# Patient Record
Sex: MALE | Race: WHITE | Employment: FULL TIME | URBAN - METROPOLITAN AREA
[De-identification: names, ages, dates, MRNs, and addresses within clinical notes are randomized per-mention and may not be internally consistent; named-entity substitution may affect disease eponyms.]

---

## 2019-09-26 ENCOUNTER — APPOINTMENT (OUTPATIENT)
Dept: CT IMAGING | Age: 59
DRG: 176 | End: 2019-09-26
Payer: COMMERCIAL

## 2019-09-26 ENCOUNTER — APPOINTMENT (OUTPATIENT)
Dept: GENERAL RADIOLOGY | Age: 59
DRG: 176 | End: 2019-09-26
Payer: COMMERCIAL

## 2019-09-26 ENCOUNTER — HOSPITAL ENCOUNTER (INPATIENT)
Age: 59
LOS: 2 days | Discharge: HOME | DRG: 176 | End: 2019-09-28
Attending: EMERGENCY MEDICINE | Admitting: INTERNAL MEDICINE
Payer: COMMERCIAL

## 2019-09-26 DIAGNOSIS — I26.09 ACUTE PULMONARY EMBOLISM WITH ACUTE COR PULMONALE, UNSPECIFIED PULMONARY EMBOLISM TYPE (HCC): Primary | ICD-10-CM

## 2019-09-26 PROBLEM — I26.99 MULTIPLE PULMONARY EMBOLI (HCC): Status: ACTIVE | Noted: 2019-09-26

## 2019-09-26 LAB
ABSOLUTE EOS #: 0.08 K/UL (ref 0–0.44)
ABSOLUTE IMMATURE GRANULOCYTE: 0.03 K/UL (ref 0–0.3)
ABSOLUTE LYMPH #: 1.15 K/UL (ref 1.1–3.7)
ABSOLUTE MONO #: 0.74 K/UL (ref 0.1–1.2)
ANION GAP SERPL CALCULATED.3IONS-SCNC: 11 MMOL/L (ref 9–17)
BASOPHILS # BLD: 1 % (ref 0–2)
BASOPHILS ABSOLUTE: 0.03 K/UL (ref 0–0.2)
BUN BLDV-MCNC: 15 MG/DL (ref 8–23)
BUN/CREAT BLD: 21 (ref 9–20)
CALCIUM SERPL-MCNC: 8.5 MG/DL (ref 8.6–10.4)
CHLORIDE BLD-SCNC: 103 MMOL/L (ref 98–107)
CO2: 27 MMOL/L (ref 20–31)
CREAT SERPL-MCNC: 0.73 MG/DL (ref 0.7–1.2)
DIFFERENTIAL TYPE: ABNORMAL
EOSINOPHILS RELATIVE PERCENT: 1 % (ref 1–4)
GFR AFRICAN AMERICAN: >60 ML/MIN
GFR NON-AFRICAN AMERICAN: >60 ML/MIN
GFR SERPL CREATININE-BSD FRML MDRD: ABNORMAL ML/MIN/{1.73_M2}
GFR SERPL CREATININE-BSD FRML MDRD: ABNORMAL ML/MIN/{1.73_M2}
GLUCOSE BLD-MCNC: 97 MG/DL (ref 70–99)
HCT VFR BLD CALC: 36.9 % (ref 40.7–50.3)
HCT VFR BLD CALC: 40 % (ref 40.7–50.3)
HEMOGLOBIN: 12.4 G/DL (ref 13–17)
HEMOGLOBIN: 13.5 G/DL (ref 13–17)
IMMATURE GRANULOCYTES: 1 %
LYMPHOCYTES # BLD: 17 % (ref 24–43)
MCH RBC QN AUTO: 30.5 PG (ref 25.2–33.5)
MCH RBC QN AUTO: 31 PG (ref 25.2–33.5)
MCHC RBC AUTO-ENTMCNC: 33.6 G/DL (ref 28.4–34.8)
MCHC RBC AUTO-ENTMCNC: 33.8 G/DL (ref 28.4–34.8)
MCV RBC AUTO: 90.9 FL (ref 82.6–102.9)
MCV RBC AUTO: 91.7 FL (ref 82.6–102.9)
MONOCYTES # BLD: 11 % (ref 3–12)
MYOGLOBIN: <21 NG/ML (ref 28–72)
NRBC AUTOMATED: 0 PER 100 WBC
NRBC AUTOMATED: 0 PER 100 WBC
PARTIAL THROMBOPLASTIN TIME: 110.6 SEC (ref 23–31)
PARTIAL THROMBOPLASTIN TIME: 27.5 SEC (ref 23–31)
PDW BLD-RTO: 12.4 % (ref 11.8–14.4)
PDW BLD-RTO: 12.5 % (ref 11.8–14.4)
PLATELET # BLD: 234 K/UL (ref 138–453)
PLATELET # BLD: 260 K/UL (ref 138–453)
PLATELET ESTIMATE: ABNORMAL
PMV BLD AUTO: 9.3 FL (ref 8.1–13.5)
PMV BLD AUTO: 9.9 FL (ref 8.1–13.5)
POTASSIUM SERPL-SCNC: 3.7 MMOL/L (ref 3.7–5.3)
RBC # BLD: 4.06 M/UL (ref 4.21–5.77)
RBC # BLD: 4.36 M/UL (ref 4.21–5.77)
RBC # BLD: ABNORMAL 10*6/UL
SEG NEUTROPHILS: 69 % (ref 36–65)
SEGMENTED NEUTROPHILS ABSOLUTE COUNT: 4.59 K/UL (ref 1.5–8.1)
SODIUM BLD-SCNC: 141 MMOL/L (ref 135–144)
TROPONIN INTERP: ABNORMAL
TROPONIN T: ABNORMAL NG/ML
TROPONIN, HIGH SENSITIVITY: 11 NG/L (ref 0–22)
WBC # BLD: 6.3 K/UL (ref 3.5–11.3)
WBC # BLD: 6.6 K/UL (ref 3.5–11.3)
WBC # BLD: ABNORMAL 10*3/UL

## 2019-09-26 PROCEDURE — 2580000003 HC RX 258: Performed by: EMERGENCY MEDICINE

## 2019-09-26 PROCEDURE — 93005 ELECTROCARDIOGRAM TRACING: CPT | Performed by: NURSE PRACTITIONER

## 2019-09-26 PROCEDURE — 84484 ASSAY OF TROPONIN QUANT: CPT

## 2019-09-26 PROCEDURE — 85730 THROMBOPLASTIN TIME PARTIAL: CPT

## 2019-09-26 PROCEDURE — 71260 CT THORAX DX C+: CPT

## 2019-09-26 PROCEDURE — 85025 COMPLETE CBC W/AUTO DIFF WBC: CPT

## 2019-09-26 PROCEDURE — 6360000004 HC RX CONTRAST MEDICATION: Performed by: EMERGENCY MEDICINE

## 2019-09-26 PROCEDURE — 2060000000 HC ICU INTERMEDIATE R&B

## 2019-09-26 PROCEDURE — 99223 1ST HOSP IP/OBS HIGH 75: CPT | Performed by: NURSE PRACTITIONER

## 2019-09-26 PROCEDURE — 85027 COMPLETE CBC AUTOMATED: CPT

## 2019-09-26 PROCEDURE — 36415 COLL VENOUS BLD VENIPUNCTURE: CPT

## 2019-09-26 PROCEDURE — 6360000002 HC RX W HCPCS: Performed by: EMERGENCY MEDICINE

## 2019-09-26 PROCEDURE — 71045 X-RAY EXAM CHEST 1 VIEW: CPT

## 2019-09-26 PROCEDURE — 80048 BASIC METABOLIC PNL TOTAL CA: CPT

## 2019-09-26 PROCEDURE — 99285 EMERGENCY DEPT VISIT HI MDM: CPT

## 2019-09-26 PROCEDURE — 83874 ASSAY OF MYOGLOBIN: CPT

## 2019-09-26 PROCEDURE — 6360000002 HC RX W HCPCS: Performed by: NURSE PRACTITIONER

## 2019-09-26 RX ORDER — MAGNESIUM SULFATE 1 G/100ML
1 INJECTION INTRAVENOUS PRN
Status: DISCONTINUED | OUTPATIENT
Start: 2019-09-26 | End: 2019-09-28 | Stop reason: HOSPADM

## 2019-09-26 RX ORDER — HEPARIN SODIUM 1000 [USP'U]/ML
80 INJECTION, SOLUTION INTRAVENOUS; SUBCUTANEOUS PRN
Status: DISCONTINUED | OUTPATIENT
Start: 2019-09-26 | End: 2019-09-28

## 2019-09-26 RX ORDER — HEPARIN SODIUM 1000 [USP'U]/ML
80 INJECTION, SOLUTION INTRAVENOUS; SUBCUTANEOUS PRN
Status: DISCONTINUED | OUTPATIENT
Start: 2019-09-26 | End: 2019-09-26 | Stop reason: SDUPTHER

## 2019-09-26 RX ORDER — POTASSIUM CHLORIDE 7.45 MG/ML
10 INJECTION INTRAVENOUS PRN
Status: DISCONTINUED | OUTPATIENT
Start: 2019-09-26 | End: 2019-09-28 | Stop reason: HOSPADM

## 2019-09-26 RX ORDER — SODIUM CHLORIDE 0.9 % (FLUSH) 0.9 %
10 SYRINGE (ML) INJECTION
Status: COMPLETED | OUTPATIENT
Start: 2019-09-26 | End: 2019-09-26

## 2019-09-26 RX ORDER — ACETAMINOPHEN 325 MG/1
650 TABLET ORAL EVERY 4 HOURS PRN
Status: DISCONTINUED | OUTPATIENT
Start: 2019-09-26 | End: 2019-09-28 | Stop reason: HOSPADM

## 2019-09-26 RX ORDER — SODIUM CHLORIDE 0.9 % (FLUSH) 0.9 %
10 SYRINGE (ML) INJECTION PRN
Status: DISCONTINUED | OUTPATIENT
Start: 2019-09-26 | End: 2019-09-28 | Stop reason: HOSPADM

## 2019-09-26 RX ORDER — HEPARIN SODIUM 10000 [USP'U]/100ML
18 INJECTION, SOLUTION INTRAVENOUS CONTINUOUS
Status: DISCONTINUED | OUTPATIENT
Start: 2019-09-26 | End: 2019-09-26 | Stop reason: SDUPTHER

## 2019-09-26 RX ORDER — POTASSIUM CHLORIDE 20 MEQ/1
40 TABLET, EXTENDED RELEASE ORAL PRN
Status: DISCONTINUED | OUTPATIENT
Start: 2019-09-26 | End: 2019-09-28 | Stop reason: HOSPADM

## 2019-09-26 RX ORDER — 0.9 % SODIUM CHLORIDE 0.9 %
80 INTRAVENOUS SOLUTION INTRAVENOUS ONCE
Status: COMPLETED | OUTPATIENT
Start: 2019-09-26 | End: 2019-09-26

## 2019-09-26 RX ORDER — SODIUM CHLORIDE 0.9 % (FLUSH) 0.9 %
10 SYRINGE (ML) INJECTION EVERY 12 HOURS SCHEDULED
Status: DISCONTINUED | OUTPATIENT
Start: 2019-09-26 | End: 2019-09-28 | Stop reason: HOSPADM

## 2019-09-26 RX ORDER — HEPARIN SODIUM 1000 [USP'U]/ML
80 INJECTION, SOLUTION INTRAVENOUS; SUBCUTANEOUS ONCE
Status: COMPLETED | OUTPATIENT
Start: 2019-09-26 | End: 2019-09-26

## 2019-09-26 RX ORDER — HEPARIN SODIUM 10000 [USP'U]/100ML
18 INJECTION, SOLUTION INTRAVENOUS CONTINUOUS
Status: DISCONTINUED | OUTPATIENT
Start: 2019-09-26 | End: 2019-09-28

## 2019-09-26 RX ORDER — HEPARIN SODIUM 1000 [USP'U]/ML
40 INJECTION, SOLUTION INTRAVENOUS; SUBCUTANEOUS PRN
Status: DISCONTINUED | OUTPATIENT
Start: 2019-09-26 | End: 2019-09-26 | Stop reason: SDUPTHER

## 2019-09-26 RX ORDER — NICOTINE 21 MG/24HR
1 PATCH, TRANSDERMAL 24 HOURS TRANSDERMAL DAILY PRN
Status: DISCONTINUED | OUTPATIENT
Start: 2019-09-26 | End: 2019-09-28 | Stop reason: HOSPADM

## 2019-09-26 RX ORDER — ONDANSETRON 2 MG/ML
4 INJECTION INTRAMUSCULAR; INTRAVENOUS EVERY 6 HOURS PRN
Status: DISCONTINUED | OUTPATIENT
Start: 2019-09-26 | End: 2019-09-28 | Stop reason: HOSPADM

## 2019-09-26 RX ORDER — HEPARIN SODIUM 1000 [USP'U]/ML
40 INJECTION, SOLUTION INTRAVENOUS; SUBCUTANEOUS PRN
Status: DISCONTINUED | OUTPATIENT
Start: 2019-09-26 | End: 2019-09-28

## 2019-09-26 RX ORDER — ONDANSETRON 4 MG/1
4 TABLET, ORALLY DISINTEGRATING ORAL EVERY 6 HOURS PRN
Status: DISCONTINUED | OUTPATIENT
Start: 2019-09-26 | End: 2019-09-28 | Stop reason: HOSPADM

## 2019-09-26 RX ORDER — HEPARIN SODIUM 1000 [USP'U]/ML
80 INJECTION, SOLUTION INTRAVENOUS; SUBCUTANEOUS ONCE
Status: DISCONTINUED | OUTPATIENT
Start: 2019-09-26 | End: 2019-09-26 | Stop reason: SDUPTHER

## 2019-09-26 RX ORDER — ONDANSETRON 2 MG/ML
4 INJECTION INTRAMUSCULAR; INTRAVENOUS EVERY 6 HOURS PRN
Status: DISCONTINUED | OUTPATIENT
Start: 2019-09-26 | End: 2019-09-26 | Stop reason: ALTCHOICE

## 2019-09-26 RX ADMIN — HEPARIN SODIUM AND DEXTROSE 15 UNITS/KG/HR: 10000; 5 INJECTION INTRAVENOUS at 22:37

## 2019-09-26 RX ADMIN — HEPARIN SODIUM 6210 UNITS: 1000 INJECTION, SOLUTION INTRAVENOUS; SUBCUTANEOUS at 19:28

## 2019-09-26 RX ADMIN — HEPARIN SODIUM AND DEXTROSE 18 UNITS/KG/HR: 10000; 5 INJECTION INTRAVENOUS at 19:29

## 2019-09-26 RX ADMIN — SODIUM CHLORIDE 80 ML: 0.9 INJECTION, SOLUTION INTRAVENOUS at 18:23

## 2019-09-26 RX ADMIN — HEPARIN SODIUM AND DEXTROSE 18 UNITS/KG/HR: 10000; 5 INJECTION INTRAVENOUS at 20:30

## 2019-09-26 RX ADMIN — IOPAMIDOL 75 ML: 755 INJECTION, SOLUTION INTRAVENOUS at 18:23

## 2019-09-26 RX ADMIN — SODIUM CHLORIDE, PRESERVATIVE FREE 10 ML: 5 INJECTION INTRAVENOUS at 18:24

## 2019-09-26 ASSESSMENT — ENCOUNTER SYMPTOMS
DIARRHEA: 0
BACK PAIN: 0
SHORTNESS OF BREATH: 1
NAUSEA: 0
COUGH: 0
VOMITING: 0
CONSTIPATION: 0
ABDOMINAL PAIN: 0
CHEST TIGHTNESS: 0

## 2019-09-26 NOTE — ED PROVIDER NOTES
drugs.    REVIEW OF SYSTEMS    (2-9 systems for level 4, 10 or more for level 5)     Review of Systems   Constitutional: Negative for activity change, diaphoresis, fatigue and fever. Respiratory: Positive for shortness of breath. Negative for cough and chest tightness. Cardiovascular: Positive for chest pain. Negative for palpitations and leg swelling. Gastrointestinal: Negative for abdominal pain, constipation, diarrhea, nausea and vomiting. Musculoskeletal: Negative for back pain and myalgias. Neurological: Negative for dizziness and headaches. Except as noted above the remainder of the review of systems was reviewed and negative. PHYSICAL EXAM    (up to 7 for level 4, 8 or more for level 5)     Vitals:    09/26/19 1843 09/26/19 1900 09/26/19 2028 09/27/19 0049   BP: (!) 145/78  (!) 143/80 116/71   Pulse: 66 65 62 64   Resp: 20 21 16 16   Temp:   99.1 °F (37.3 °C) 97.9 °F (36.6 °C)   TempSrc:   Oral Oral   SpO2: 97% 97% 99% 96%   Weight:       Height:             Physical Exam   Constitutional: He is oriented to person, place, and time. He appears well-developed and well-nourished. HENT:   Mouth/Throat: Oropharynx is clear and moist. No oropharyngeal exudate. Eyes: Conjunctivae are normal. Right eye exhibits no discharge. Left eye exhibits no discharge. Cardiovascular: Normal rate and regular rhythm. Pulmonary/Chest: Effort normal and breath sounds normal.   Abdominal: Soft. There is no tenderness. Musculoskeletal: He exhibits no edema or tenderness. Neurological: He is alert and oriented to person, place, and time. Skin: Skin is warm and dry. Psychiatric: He has a normal mood and affect.  His behavior is normal.           DIAGNOSTIC RESULTS     EKG: All EKG's are interpreted by the Emergency Department Physician who either signs or Co-signs this chart in the absence of a cardiologist.    EKG was interpreted by Dr. Richard Alcantar:   Non-plain film images such as CT, 0049   BP: (!) 145/78  (!) 143/80 116/71   Pulse: 66 65 62 64   Resp: 20 21 16 16   Temp:   99.1 °F (37.3 °C) 97.9 °F (36.6 °C)   TempSrc:   Oral Oral   SpO2: 97% 97% 99% 96%   Weight:       Height:           Medical Decision Makin-year-old male with chest pain after recent travel. CT scan confirms PE with right heart strain. Additional work-up was unremarkable. Started on heparin and will be admitted for further evaluation and treatment. CONSULTS:  IP CONSULT TO INTERNAL MEDICINE  IP CONSULT TO INTERVENTIONAL RADIOLOGY  IP CONSULT TO PULMONOLOGY    PROCEDURES:  None    FINAL IMPRESSION      1. Acute pulmonary embolism with acute cor pulmonale, unspecified pulmonary embolism type [facility])          DISPOSITION/PLAN   DISPOSITION Admitted 2019 07:22:55 PM      PATIENT REFERRED TO:   Hussain Ramey MD  [address] [facility] [address] [phone]            DISCHARGE MEDICATIONS:     There are no discharge medications for this patient. (Please note that portions of this note were completed with a voice recognition program.  Efforts were made to edit the dictations but occasionally words are mis-transcribed. )    AZEB JIMENEZ, VISH - VISH Snyder - BRENDON  [phone]

## 2019-09-27 PROBLEM — R09.89 PLEURAL RUB: Status: ACTIVE | Noted: 2019-09-27

## 2019-09-27 LAB
ANION GAP SERPL CALCULATED.3IONS-SCNC: 10 MMOL/L (ref 9–17)
BUN BLDV-MCNC: 12 MG/DL (ref 8–23)
BUN/CREAT BLD: 18 (ref 9–20)
CALCIUM SERPL-MCNC: 8.1 MG/DL (ref 8.6–10.4)
CHLORIDE BLD-SCNC: 105 MMOL/L (ref 98–107)
CO2: 26 MMOL/L (ref 20–31)
CREAT SERPL-MCNC: 0.68 MG/DL (ref 0.7–1.2)
EKG ATRIAL RATE: 65 BPM
EKG P AXIS: 51 DEGREES
EKG P-R INTERVAL: 180 MS
EKG Q-T INTERVAL: 406 MS
EKG QRS DURATION: 126 MS
EKG QTC CALCULATION (BAZETT): 422 MS
EKG R AXIS: 61 DEGREES
EKG T AXIS: 51 DEGREES
EKG VENTRICULAR RATE: 65 BPM
GFR AFRICAN AMERICAN: >60 ML/MIN
GFR NON-AFRICAN AMERICAN: >60 ML/MIN
GFR SERPL CREATININE-BSD FRML MDRD: ABNORMAL ML/MIN/{1.73_M2}
GFR SERPL CREATININE-BSD FRML MDRD: ABNORMAL ML/MIN/{1.73_M2}
GLUCOSE BLD-MCNC: 100 MG/DL (ref 70–99)
HCT VFR BLD CALC: 37.2 % (ref 40.7–50.3)
HEMOGLOBIN: 12.4 G/DL (ref 13–17)
INR BLD: 1
LV EF: 60 %
LVEF MODALITY: NORMAL
MCH RBC QN AUTO: 30.8 PG (ref 25.2–33.5)
MCHC RBC AUTO-ENTMCNC: 33.3 G/DL (ref 28.4–34.8)
MCV RBC AUTO: 92.3 FL (ref 82.6–102.9)
NRBC AUTOMATED: 0 PER 100 WBC
PARTIAL THROMBOPLASTIN TIME: 42.8 SEC (ref 23–31)
PARTIAL THROMBOPLASTIN TIME: 49.2 SEC (ref 23–31)
PARTIAL THROMBOPLASTIN TIME: 58.5 SEC (ref 23–31)
PDW BLD-RTO: 12.5 % (ref 11.8–14.4)
PLATELET # BLD: 227 K/UL (ref 138–453)
PMV BLD AUTO: 9.3 FL (ref 8.1–13.5)
POTASSIUM SERPL-SCNC: 4 MMOL/L (ref 3.7–5.3)
PROTHROMBIN TIME: 10.3 SEC (ref 9.7–11.6)
RBC # BLD: 4.03 M/UL (ref 4.21–5.77)
SODIUM BLD-SCNC: 141 MMOL/L (ref 135–144)
WBC # BLD: 5.6 K/UL (ref 3.5–11.3)

## 2019-09-27 PROCEDURE — 6360000002 HC RX W HCPCS: Performed by: NURSE PRACTITIONER

## 2019-09-27 PROCEDURE — 85610 PROTHROMBIN TIME: CPT

## 2019-09-27 PROCEDURE — 80048 BASIC METABOLIC PNL TOTAL CA: CPT

## 2019-09-27 PROCEDURE — 6360000002 HC RX W HCPCS: Performed by: RADIOLOGY

## 2019-09-27 PROCEDURE — 2060000000 HC ICU INTERMEDIATE R&B

## 2019-09-27 PROCEDURE — 93970 EXTREMITY STUDY: CPT

## 2019-09-27 PROCEDURE — 99232 SBSQ HOSP IP/OBS MODERATE 35: CPT | Performed by: INTERNAL MEDICINE

## 2019-09-27 PROCEDURE — 85730 THROMBOPLASTIN TIME PARTIAL: CPT

## 2019-09-27 PROCEDURE — 85027 COMPLETE CBC AUTOMATED: CPT

## 2019-09-27 PROCEDURE — 36415 COLL VENOUS BLD VENIPUNCTURE: CPT

## 2019-09-27 PROCEDURE — 93306 TTE W/DOPPLER COMPLETE: CPT

## 2019-09-27 RX ORDER — FENTANYL CITRATE 50 UG/ML
INJECTION, SOLUTION INTRAMUSCULAR; INTRAVENOUS
Status: COMPLETED | OUTPATIENT
Start: 2019-09-27 | End: 2019-09-27

## 2019-09-27 RX ADMIN — Medication 50 MCG: at 14:55

## 2019-09-27 RX ADMIN — HEPARIN SODIUM 3100 UNITS: 1000 INJECTION INTRAVENOUS; SUBCUTANEOUS at 05:24

## 2019-09-27 RX ADMIN — HEPARIN SODIUM AND DEXTROSE 17 UNITS/KG/HR: 10000; 5 INJECTION INTRAVENOUS at 05:25

## 2019-09-27 RX ADMIN — HEPARIN SODIUM 3100 UNITS: 1000 INJECTION INTRAVENOUS; SUBCUTANEOUS at 17:00

## 2019-09-27 RX ADMIN — HEPARIN SODIUM 3100 UNITS: 1000 INJECTION INTRAVENOUS; SUBCUTANEOUS at 11:00

## 2019-09-27 ASSESSMENT — ENCOUNTER SYMPTOMS
CONSTIPATION: 0
WHEEZING: 0
SHORTNESS OF BREATH: 1
VOMITING: 0
COUGH: 0
NAUSEA: 0
BLOOD IN STOOL: 0
ABDOMINAL PAIN: 0
COLOR CHANGE: 0
DIARRHEA: 0

## 2019-09-27 ASSESSMENT — PAIN SCALES - GENERAL
PAINLEVEL_OUTOF10: 0
PAINLEVEL_OUTOF10: 0

## 2019-09-27 NOTE — CARE COORDINATION
too high. He does have [employer] and did give the 10 dollar co pay card to investigate next week if need to. Did send down first month for starter pack for eliquis to have for patient tomorrow. Did perfect serve dr Isaias Ford to update on the above. Will follow.      Electronically signed by Delroy Baker RN on 9/27/19 at 3:43 PM

## 2019-09-27 NOTE — H&P
[address] [facility]    HISTORY AND PHYSICAL EXAMINATION            Date:   9/26/2019  Patient name:  Shahnaz Byrnes  Date of admission:  9/26/2019  5:12 PM  MRN:   [id number]  Account:  [de-identified]  YOB: 1960  PCP:    Jaelyn Foster MD  Room:   [facility]-  Code Status:    Full Code    Chief Complaint:     Left sided diaphragm pain, shortness of breath. History Obtained From:     Patient and electronic medical record. History of Present Illness:     Shahnaz Byrnes is a 60 y.o.  male who presents to the hospital with lower left sided chest pain and shortness of breath. The patient reports he traveled to [facility] by car from [place] yesterday and started experiencing left lower chest pain that worsened with inspiration. He describes his pain as sharp, intermittent and 8/10 at its peak. He has associated shortness of breath. He also states he has had night sweats. The patient reports he was evaluated at an urgent care earlier today, he was \"given a shot\" and subsequently discharged. He denies past medical history, and takes no home medications. He does not smoke. He will be admitted for further evaluation and treatment. CT chest with contrast indicates segmental and subsegmental arterial emboli within the lingula and left lower love. RV & LV ratio greater than 1 indicating right heart strain. Small left effusion and bibasilar atelectasis/scaring. ER physician discussed possibility of EKOS treatment with Dr. Miguel Buckley, decision was made to treat with high intensity heparin drip. Past Medical History:     History reviewed. No pertinent past medical history. Past Surgical History:     Past Surgical History:   Procedure Laterality Date    HERNIA REPAIR      NASAL SEPTUM SURGERY          Medications Prior to Admission:     Prior to Admission medications    Not on File        Allergies:     Patient has no known allergies.     Social Nose: Nose normal. No rhinorrhea. Mouth/Throat: Oropharynx is clear and moist and mucous membranes are normal.   Eyes: Pupils are equal, round, and reactive to light. Conjunctivae, EOM and lids are normal. Right conjunctiva is not injected. Left conjunctiva is not injected. Right eye exhibits normal extraocular motion. Left eye exhibits normal extraocular motion. Right pupil is reactive. Left pupil is reactive. Pupils are equal.   Neck: Trachea normal and phonation normal. Neck supple. Carotid bruit is not present. No tracheal deviation present. No thyromegaly present. Cardiovascular: Normal rate, regular rhythm, normal heart sounds, intact distal pulses and normal pulses. No murmur heard. Pulmonary/Chest: Effort normal. No stridor. No respiratory distress. He has decreased breath sounds in the right lower field and the left lower field. Abdominal: Soft. Bowel sounds are normal. He exhibits no distension and no mass. There is no hepatosplenomegaly. There is no tenderness. There is no guarding. Musculoskeletal: He exhibits no edema or tenderness. Neurological: He is alert and oriented to person, place, and time. He is not disoriented. No cranial nerve deficit. Skin: Skin is warm, dry and intact. No lesion and no rash noted. He is not diaphoretic. No erythema. Psychiatric: He has a normal mood and affect. His speech is normal and behavior is normal. He is not actively hallucinating. Cognition and memory are normal.   Nursing note and vitals reviewed.       Investigations:      Laboratory Testing:  Recent Results (from the past 24 hour(s))   EKG 12 Lead    Collection Time: 09/26/19  5:08 PM   Result Value Ref Range    Ventricular Rate 65 BPM    Atrial Rate 65 BPM    P-R Interval 180 ms    QRS Duration 126 ms    Q-T Interval 406 ms    QTc Calculation (Bazett) 422 ms    P Axis 51 degrees    R Axis 61 degrees    T Axis 51 degrees   CBC Auto Differential    Collection Time: 09/26/19  5:21 PM   Result Value Ref Range    WBC 6.6 3.5 - 11.3 k/uL    RBC 4.36 4.21 - 5.77 m/uL    Hemoglobin 13.5 13.0 - 17.0 g/dL    Hematocrit 40.0 (L) 40.7 - 50.3 %    MCV 91.7 82.6 - 102.9 fL    MCH 31.0 25.2 - 33.5 pg    MCHC 33.8 28.4 - 34.8 g/dL    RDW 12.5 11.8 - 14.4 %    Platelets 250 233 - 855 k/uL    MPV 9.9 8.1 - 13.5 fL    NRBC Automated 0.0 0.0 per 100 WBC    Differential Type NOT REPORTED     Seg Neutrophils 69 (H) 36 - 65 %    Lymphocytes 17 (L) 24 - 43 %    Monocytes 11 3 - 12 %    Eosinophils % 1 1 - 4 %    Basophils 1 0 - 2 %    Immature Granulocytes 1 (H) 0 %    Segs Absolute 4.59 1.50 - 8.10 k/uL    Absolute Lymph # 1.15 1.10 - 3.70 k/uL    Absolute Mono # 0.74 0.10 - 1.20 k/uL    Absolute Eos # 0.08 0.00 - 0.44 k/uL    Basophils Absolute 0.03 0.00 - 0.20 k/uL    Absolute Immature Granulocyte 0.03 0.00 - 0.30 k/uL    WBC Morphology NOT REPORTED     RBC Morphology NOT REPORTED     Platelet Estimate NOT REPORTED    Basic Metabolic Panel    Collection Time: 09/26/19  5:21 PM   Result Value Ref Range    Glucose 97 70 - 99 mg/dL    BUN 15 8 - 23 mg/dL    CREATININE 0.73 0.70 - 1.20 mg/dL    Bun/Cre Ratio 21 (H) 9 - 20    Calcium 8.5 (L) 8.6 - 10.4 mg/dL    Sodium 141 135 - 144 mmol/L    Potassium 3.7 3.7 - 5.3 mmol/L    Chloride 103 98 - 107 mmol/L    CO2 27 20 - 31 mmol/L    Anion Gap 11 9 - 17 mmol/L    GFR Non-African American >60 >60 mL/min    GFR African American >60 >60 mL/min    GFR Comment          GFR Staging NOT REPORTED    TROP/MYOGLOBIN    Collection Time: 09/26/19  5:21 PM   Result Value Ref Range    Troponin, High Sensitivity 11 0 - 22 ng/L    Troponin T NOT REPORTED <0.03 ng/mL    Troponin Interp NOT REPORTED     Myoglobin <21 (L) 28 - 72 ng/mL   APTT    Collection Time: 09/26/19  5:21 PM   Result Value Ref Range    PTT 27.5 23 - 31 sec   CBC    Collection Time: 09/26/19  8:50 PM   Result Value Ref Range    WBC 6.3 3.5 - 11.3 k/uL    RBC 4.06 (L) 4.21 - 5.77 m/uL    Hemoglobin 12.4 (L) 13.0 - 17.0 g/dL Hematocrit 36.9 (L) 40.7 - 50.3 %    MCV 90.9 82.6 - 102.9 fL    MCH 30.5 25.2 - 33.5 pg    MCHC 33.6 28.4 - 34.8 g/dL    RDW 12.4 11.8 - 14.4 %    Platelets 659 698 - 245 k/uL    MPV 9.3 8.1 - 13.5 fL    NRBC Automated 0.0 0.0 per 100 WBC   APTT    Collection Time: 09/26/19  8:50 PM   Result Value Ref Range    .6 (HH) 23 - 31 sec       Imaging/Diagnostics:  Xr Chest Portable    Result Date: 9/26/2019  Scar or atelectasis at the left base. Costophrenic angles are not included on the field of view. Otherwise unremarkable chest examination. Ct Chest Pulmonary Embolism W Contrast    Result Date: 9/26/2019  Segmental and subsegmental arterial emboli within the lingula and left lower lobe. RV LV ratio greater than 1, indicating right heart strain. Small left effusion and bibasilar atelectasis/scarring. Assessment :      Hospital Problems           Last Modified POA    * (Principal) Acute pulmonary embolism with acute cor pulmonale ([facility] [address] 9/26/2019 Yes    Multiple pulmonary emboli ([facility] [address] 9/26/2019 Yes          Plan:     Patient status Admit as inpatient to the progressive care unit. 1. Consult pulmonology. 2. High intensity heparin drip. 3. Monitor vitals. 4. Follow chemistries. 5. Pain control. 6. Supplemental oxygen as needed. 7. Replete electrolytes as needed. 8. General diet. 9. Activity as tolerated with assist.    Plan of care discussed with patient and primary RN. Consultations:   IP CONSULT TO INTERNAL MEDICINE  IP CONSULT TO INTERVENTIONAL RADIOLOGY  IP CONSULT TO PULMONOLOGY    Patient is admitted as inpatient status because of co-morbidities listed above, severity of signs and symptoms as outlined, requirement for current medical therapies and most importantly because of direct risk to patient if care not provided in a hospital setting.     Shaunna Beckford, VISH - CNP  9/26/2019  10:31 PM    Copy sent to Dr. Shaniqua Sr MD

## 2019-09-27 NOTE — PLAN OF CARE
Problem: Cardiac:  Goal: Ability to maintain an adequate cardiac output will improve  Description  Ability to maintain an adequate cardiac output will improve  Outcome: Ongoing  Note:   Assess cardiac monitor, heart rate, & pulse. Assess for any arrhythmias. Assessed IV administration. O2 provided as needed. Goal: Hemodynamic stability will improve  Description  Hemodynamic stability will improve  Outcome: Ongoing  Note:   Assess rate & rhythm. Monitor pt's weight daily. Assess color & texture of skin. Monitor vital signs at least every shift & as needed. Monitor intake & output at least every shift. Problem: Respiratory:  Goal: Respiratory status will improve  Description  Respiratory status will improve  Outcome: Ongoing  Note:   Assess for adventitious breath sounds. Monitored SaO2 > 90%. Applied 02 per nasal cannula as needed. Elevated HOB to improve breathing as needed. Problem: HEMODYNAMIC STATUS  Goal: Patient has stable vital signs and fluid balance  Outcome: Ongoing  Note:   Assess rate & rhythm. Monitor pt's weight daily. Assess color & texture of skin. Monitor vital signs at least every shift & as needed. Monitor intake & output at least every shift.

## 2019-09-28 VITALS
OXYGEN SATURATION: 95 % | DIASTOLIC BLOOD PRESSURE: 73 MMHG | HEIGHT: 72 IN | RESPIRATION RATE: 18 BRPM | BODY MASS INDEX: 22.58 KG/M2 | WEIGHT: 166.7 LBS | HEART RATE: 63 BPM | TEMPERATURE: 98.4 F | SYSTOLIC BLOOD PRESSURE: 118 MMHG

## 2019-09-28 PROBLEM — R09.89 PLEURAL RUB: Status: RESOLVED | Noted: 2019-09-27 | Resolved: 2019-09-28

## 2019-09-28 LAB
PARTIAL THROMBOPLASTIN TIME: 53.8 SEC (ref 23–31)
PARTIAL THROMBOPLASTIN TIME: 70.3 SEC (ref 23–31)
PLATELET # BLD: 255 K/UL (ref 138–453)

## 2019-09-28 PROCEDURE — 85049 AUTOMATED PLATELET COUNT: CPT

## 2019-09-28 PROCEDURE — 85730 THROMBOPLASTIN TIME PARTIAL: CPT

## 2019-09-28 PROCEDURE — 99239 HOSP IP/OBS DSCHRG MGMT >30: CPT | Performed by: INTERNAL MEDICINE

## 2019-09-28 PROCEDURE — 6370000000 HC RX 637 (ALT 250 FOR IP): Performed by: INTERNAL MEDICINE

## 2019-09-28 PROCEDURE — 6360000002 HC RX W HCPCS: Performed by: NURSE PRACTITIONER

## 2019-09-28 PROCEDURE — 36415 COLL VENOUS BLD VENIPUNCTURE: CPT

## 2019-09-28 RX ADMIN — HEPARIN SODIUM AND DEXTROSE 21 UNITS/KG/HR: 10000; 5 INJECTION INTRAVENOUS at 02:42

## 2019-09-28 RX ADMIN — HEPARIN SODIUM 3100 UNITS: 1000 INJECTION INTRAVENOUS; SUBCUTANEOUS at 06:40

## 2019-09-28 RX ADMIN — APIXABAN 10 MG: 5 TABLET, FILM COATED ORAL at 11:38

## 2019-09-28 NOTE — DISCHARGE SUMMARY
decision was made to treat with high intensity heparin drip. Patient made excellent clinical improvement and will be transitioned onto Eliquis and discharged today in a stable condition. I had a long conversation today with the patient and wife regarding all of his findings and results including mild right heart strain from acute pulmonary embolism. Patient is concerned about ability to play basketball which he does on a regular basis. I spoke to him at length about risks versus benefit of anticoagulation therapy. I explained that he will have increased bruising on anticoagulation therapy. He will have to use his judgment on the aggressiveness of sports activity as he would be high risk for injury/bleeding with aggressive sports. If he is getting pushed against the wall or falling frequently or hitting his head, obviously I cannot condone that activity on anticoagulation therapy. If he is simply exerting himself and playing a friendly game of basketball, this seems like a reasonable activity level on anticoagulation therapy. He will discuss further with his primary care physician at follow-up. We also spoke about pulmonary recommendation to proceed with hypercoagulable work-up once off of blood thinners. I would recommend at least 6 months treatment and then checking hypercoagulable panel a couple weeks after Eliquis therapy is discontinued. We also spoke about follow-up at the anticoagulation clinic to assure that he can get his prescriptions filled and they have the best knowledge about co-pay programs etc.  He can always transition to Coumadin if cost becomes an issue and I explained this to him and wife today at length in detail.     Significant therapeutic interventions: High intensity heparin protocol    Significant Diagnostic Studies:   Labs / Micro:  CBC:   Lab Results   Component Value Date    WBC 5.6 09/27/2019    RBC 4.03 09/27/2019    HGB 12.4 09/27/2019    HCT 37.2 09/27/2019    MCV 92.3 09/27/2019    MCH 30.8 09/27/2019    MCHC 33.3 09/27/2019    RDW 12.5 09/27/2019     09/28/2019     BMP:    Lab Results   Component Value Date    GLUCOSE 100 09/27/2019     09/27/2019    K 4.0 09/27/2019     09/27/2019    CO2 26 09/27/2019    ANIONGAP 10 09/27/2019    BUN 12 09/27/2019    CREATININE 0.68 09/27/2019    BUNCRER 18 09/27/2019    CALCIUM 8.1 09/27/2019    LABGLOM >60 09/27/2019    GFRAA >60 09/27/2019    GFR      09/27/2019    GFR NOT REPORTED 09/27/2019     HFP:  No results found for: ALB, PROT  CMP:    Lab Results   Component Value Date    GLUCOSE 100 09/27/2019     09/27/2019    K 4.0 09/27/2019     09/27/2019    CO2 26 09/27/2019    BUN 12 09/27/2019    CREATININE 0.68 09/27/2019    ANIONGAP 10 09/27/2019    LABGLOM >60 09/27/2019    GFRAA >60 09/27/2019    GFR      09/27/2019    GFR NOT REPORTED 09/27/2019    CALCIUM 8.1 09/27/2019     PT/INR:    Lab Results   Component Value Date    PROTIME 10.3 09/27/2019    INR 1.0 09/27/2019     PTT:   Lab Results   Component Value Date    APTT 53.8 09/28/2019     FLP:    Lab Results   Component Value Date    CHOL 167 11/20/2018    TRIG 62 11/20/2018    HDL 50 11/20/2018     U/A:    Lab Results   Component Value Date    COLORU yellow 11/20/2018    SPECGRAV 1.020 11/20/2018    PHUR 6.0 11/20/2018    PROTEINU neg 11/20/2018    GLUCOSEU neg 11/20/2018    KETUA neg 11/20/2018    BILIRUBINUR neg 11/20/2018    LEUKOCYTESUR neg 11/20/2018     TSH:  No results found for: TSH    Radiology:  Xr Chest Portable    Result Date: 9/26/2019  Scar or atelectasis at the left base. Costophrenic angles are not included on the field of view. Otherwise unremarkable chest examination. Ct Chest Pulmonary Embolism W Contrast    Result Date: 9/26/2019  Segmental and subsegmental arterial emboli within the lingula and left lower lobe. RV LV ratio greater than 1, indicating right heart strain. Small left effusion and bibasilar atelectasis/scarring.

## 2019-09-28 NOTE — PROGRESS NOTES
30 ml green/yellow retrieved from abscess. Will send for cytology.
CLINICAL PHARMACY NOTE: MEDS TO [address] Select Patient?: No  Total # of Prescriptions Filled: 1   The following medications were delivered to the patient:  · eliquis starter pack  Total # of Interventions Completed: 0  Time Spent (min): 30    Additional Documentation:
Patient given d/c information and questions answered.   Sent home
rub has resolved  Heart:  regular rate and rhythm, no murmur  Abdomen:  soft, nontender, nondistended, normal bowel sounds, no masses, hepatomegaly, splenomegaly  Extremities:  no edema, redness, tenderness in the calves  Skin:  no gross lesions, rashes, induration    Assessment:        Hospital Problems           Last Modified POA    * (Principal) Acute pulmonary embolism with acute cor pulmonale ([address] 9/26/2019 Yes    Multiple pulmonary emboli ([address] 9/26/2019 Yes    Pleural rub 9/27/2019 Yes          Plan:        1. Discharge today  2. See discharge summary  3. Greater than 30 minutes spent with visit and discharge plans today. I reviewed all findings in detail, we reviewed CT angios on the computer screen, I reviewed anticoagulation risks and benefits in detail. We spoke about risks of playing basketball on blood thinners and patient and wife understand.     Mei Bearden DO  9/28/2019  11:03 AM

## 2020-01-07 ENCOUNTER — HOSPITAL ENCOUNTER (OUTPATIENT)
Age: 60
Setting detail: SPECIMEN
Discharge: HOME | End: 2020-01-07
Payer: COMMERCIAL

## 2020-01-10 LAB — SURGICAL PATHOLOGY REPORT: NORMAL

## 2021-04-27 ENCOUNTER — HOSPITAL ENCOUNTER (OUTPATIENT)
Age: 61
Setting detail: SPECIMEN
Discharge: HOME | End: 2021-04-27
Payer: COMMERCIAL

## 2021-04-27 DIAGNOSIS — R82.2: ICD-10-CM

## 2021-04-27 LAB
ABSOLUTE EOS #: 0.09 K/UL (ref 0–0.4)
ABSOLUTE IMMATURE GRANULOCYTE: 0 K/UL (ref 0–0.3)
ABSOLUTE LYMPH #: 0.89 K/UL (ref 1–4.8)
ABSOLUTE MONO #: 0.33 K/UL (ref 0.1–0.8)
ALT SERPL-CCNC: 23 U/L (ref 5–41)
ANION GAP SERPL CALCULATED.3IONS-SCNC: 10 MMOL/L (ref 9–17)
AST SERPL-CCNC: 28 U/L
BASOPHILS # BLD: 0 % (ref 0–2)
BASOPHILS ABSOLUTE: 0 K/UL (ref 0–0.2)
BILIRUB SERPL-MCNC: 0.55 MG/DL (ref 0.3–1.2)
BILIRUBIN DIRECT: 0.13 MG/DL
BUN BLDV-MCNC: 23 MG/DL (ref 8–23)
BUN/CREAT BLD: ABNORMAL (ref 9–20)
CALCIUM SERPL-MCNC: 8.8 MG/DL (ref 8.6–10.4)
CHLORIDE BLD-SCNC: 100 MMOL/L (ref 98–107)
CO2: 24 MMOL/L (ref 20–31)
CREAT SERPL-MCNC: 0.77 MG/DL (ref 0.7–1.2)
DIFFERENTIAL TYPE: ABNORMAL
EOSINOPHILS RELATIVE PERCENT: 2 % (ref 1–4)
GFR AFRICAN AMERICAN: >60 ML/MIN
GFR NON-AFRICAN AMERICAN: >60 ML/MIN
GFR SERPL CREATININE-BSD FRML MDRD: ABNORMAL ML/MIN/{1.73_M2}
GFR SERPL CREATININE-BSD FRML MDRD: ABNORMAL ML/MIN/{1.73_M2}
GLUCOSE FASTING: 97 MG/DL (ref 70–99)
HCT VFR BLD CALC: 42.7 % (ref 40.7–50.3)
HEMOGLOBIN: 14.2 G/DL (ref 13–17)
IMMATURE GRANULOCYTES: 0 %
LYMPHOCYTES # BLD: 19 % (ref 24–44)
MCH RBC QN AUTO: 29.5 PG (ref 25.2–33.5)
MCHC RBC AUTO-ENTMCNC: 33.3 G/DL (ref 28.4–34.8)
MCV RBC AUTO: 88.6 FL (ref 82.6–102.9)
MONOCYTES # BLD: 7 % (ref 1–7)
MORPHOLOGY: NORMAL
NRBC AUTOMATED: 0 PER 100 WBC
PDW BLD-RTO: 13.6 % (ref 11.8–14.4)
PLATELET # BLD: 238 K/UL (ref 138–453)
PLATELET ESTIMATE: ABNORMAL
PMV BLD AUTO: 10.4 FL (ref 8.1–13.5)
POTASSIUM SERPL-SCNC: 4 MMOL/L (ref 3.7–5.3)
RBC # BLD: 4.82 M/UL (ref 4.21–5.77)
RBC # BLD: ABNORMAL 10*6/UL
SEG NEUTROPHILS: 72 % (ref 36–66)
SEGMENTED NEUTROPHILS ABSOLUTE COUNT: 3.39 K/UL (ref 1.8–7.7)
SODIUM BLD-SCNC: 134 MMOL/L (ref 135–144)
WBC # BLD: 4.7 K/UL (ref 3.5–11.3)
WBC # BLD: ABNORMAL 10*3/UL

## 2022-12-22 ENCOUNTER — HOSPITAL ENCOUNTER (OUTPATIENT)
Age: 62
Setting detail: SPECIMEN
Discharge: HOME | End: 2022-12-22

## 2022-12-22 DIAGNOSIS — E78.5 HYPERLIPIDEMIA, UNSPECIFIED HYPERLIPIDEMIA TYPE: ICD-10-CM

## 2022-12-22 DIAGNOSIS — R53.83 OTHER FATIGUE: ICD-10-CM

## 2022-12-22 DIAGNOSIS — E55.9 VITAMIN D DEFICIENCY: ICD-10-CM

## 2022-12-22 DIAGNOSIS — E16.2 HYPOGLYCEMIA: ICD-10-CM

## 2022-12-22 DIAGNOSIS — R35.1 NOCTURIA: ICD-10-CM

## 2022-12-22 LAB
ABSOLUTE EOS #: 0.08 K/UL (ref 0–0.44)
ABSOLUTE IMMATURE GRANULOCYTE: <0.03 K/UL (ref 0–0.3)
ABSOLUTE LYMPH #: 0.95 K/UL (ref 1.1–3.7)
ABSOLUTE MONO #: 0.39 K/UL (ref 0.1–1.2)
ALT SERPL-CCNC: 20 U/L (ref 5–41)
ANION GAP SERPL CALCULATED.3IONS-SCNC: 8 MMOL/L (ref 9–17)
AST SERPL-CCNC: 33 U/L
BASOPHILS # BLD: 1 % (ref 0–2)
BASOPHILS ABSOLUTE: 0.04 K/UL (ref 0–0.2)
BUN BLDV-MCNC: 13 MG/DL (ref 8–23)
CALCIUM SERPL-MCNC: 8.9 MG/DL (ref 8.6–10.4)
CHLORIDE BLD-SCNC: 100 MMOL/L (ref 98–107)
CHOLESTEROL/HDL RATIO: 3.2
CHOLESTEROL: 175 MG/DL
CO2: 29 MMOL/L (ref 20–31)
CREAT SERPL-MCNC: 0.84 MG/DL (ref 0.7–1.2)
EOSINOPHILS RELATIVE PERCENT: 2 % (ref 1–4)
ESTIMATED AVERAGE GLUCOSE: 94 MG/DL
GFR SERPL CREATININE-BSD FRML MDRD: >60 ML/MIN/1.73M2
GLUCOSE FASTING: 82 MG/DL (ref 70–99)
HBA1C MFR BLD: 4.9 % (ref 4–6)
HCT VFR BLD CALC: 46.2 % (ref 40.7–50.3)
HDLC SERPL-MCNC: 55 MG/DL
HEMOGLOBIN: 15.5 G/DL (ref 13–17)
IMMATURE GRANULOCYTES: 1 %
LDL CHOLESTEROL: 109 MG/DL (ref 0–130)
LYMPHOCYTES # BLD: 27 % (ref 24–43)
MCH RBC QN AUTO: 31.3 PG (ref 25.2–33.5)
MCHC RBC AUTO-ENTMCNC: 33.5 G/DL (ref 28.4–34.8)
MCV RBC AUTO: 93.3 FL (ref 82.6–102.9)
MONOCYTES # BLD: 11 % (ref 3–12)
NRBC AUTOMATED: 0 PER 100 WBC
PDW BLD-RTO: 13 % (ref 11.8–14.4)
PLATELET # BLD: 231 K/UL (ref 138–453)
PMV BLD AUTO: 9.9 FL (ref 8.1–13.5)
POTASSIUM SERPL-SCNC: 4 MMOL/L (ref 3.7–5.3)
PROSTATE SPECIFIC ANTIGEN: 0.65 NG/ML
RBC # BLD: 4.95 M/UL (ref 4.21–5.77)
SEG NEUTROPHILS: 58 % (ref 36–65)
SEGMENTED NEUTROPHILS ABSOLUTE COUNT: 2 K/UL (ref 1.5–8.1)
SEX HORMONE BINDING GLOBULIN: 66 NMOL/L (ref 11–80)
SODIUM BLD-SCNC: 137 MMOL/L (ref 135–144)
TESTOSTERONE FREE-NONMALE: 129.6 PG/ML (ref 47–244)
TESTOSTERONE TOTAL: 894 NG/DL (ref 220–1000)
THYROXINE, FREE: 1.24 NG/DL (ref 0.93–1.7)
TRIGL SERPL-MCNC: 56 MG/DL
TSH SERPL DL<=0.05 MIU/L-ACNC: 1.51 UIU/ML (ref 0.3–5)
VITAMIN B-12: 537 PG/ML (ref 232–1245)
VITAMIN D 25-HYDROXY: 27.4 NG/ML
WBC # BLD: 3.5 K/UL (ref 3.5–11.3)

## 2023-06-27 ENCOUNTER — HOSPITAL ENCOUNTER (OUTPATIENT)
Dept: GENERAL RADIOLOGY | Facility: CLINIC | Age: 63
Discharge: HOME | End: 2023-06-29
Payer: COMMERCIAL

## 2023-06-27 DIAGNOSIS — M25.561 ACUTE PAIN OF RIGHT KNEE: ICD-10-CM

## 2023-06-27 PROCEDURE — 73562 X-RAY EXAM OF KNEE 3: CPT

## 2024-04-15 ENCOUNTER — HOSPITAL ENCOUNTER (OUTPATIENT)
Dept: GENERAL RADIOLOGY | Facility: CLINIC | Age: 64
Discharge: HOME | End: 2024-04-17
Payer: COMMERCIAL

## 2024-04-15 DIAGNOSIS — M79.672 FOOT PAIN, BILATERAL: ICD-10-CM

## 2024-04-15 DIAGNOSIS — M79.671 FOOT PAIN, BILATERAL: ICD-10-CM

## 2024-04-15 PROCEDURE — 73630 X-RAY EXAM OF FOOT: CPT
